# Patient Record
Sex: FEMALE | Race: WHITE | NOT HISPANIC OR LATINO | URBAN - METROPOLITAN AREA
[De-identification: names, ages, dates, MRNs, and addresses within clinical notes are randomized per-mention and may not be internally consistent; named-entity substitution may affect disease eponyms.]

---

## 2017-04-22 ENCOUNTER — COMMUNICATION - HEALTHEAST (OUTPATIENT)
Dept: FAMILY MEDICINE | Facility: CLINIC | Age: 62
End: 2017-04-22

## 2017-04-25 ENCOUNTER — COMMUNICATION - HEALTHEAST (OUTPATIENT)
Dept: FAMILY MEDICINE | Facility: CLINIC | Age: 62
End: 2017-04-25

## 2017-04-25 ENCOUNTER — AMBULATORY - HEALTHEAST (OUTPATIENT)
Dept: FAMILY MEDICINE | Facility: CLINIC | Age: 62
End: 2017-04-25

## 2017-04-25 DIAGNOSIS — L03.119 CELLULITIS AND ABSCESS OF LEG: ICD-10-CM

## 2017-04-25 DIAGNOSIS — L02.419 CELLULITIS AND ABSCESS OF LEG: ICD-10-CM

## 2017-04-26 ENCOUNTER — COMMUNICATION - HEALTHEAST (OUTPATIENT)
Dept: FAMILY MEDICINE | Facility: CLINIC | Age: 62
End: 2017-04-26

## 2017-04-26 ENCOUNTER — AMBULATORY - HEALTHEAST (OUTPATIENT)
Dept: FAMILY MEDICINE | Facility: CLINIC | Age: 62
End: 2017-04-26

## 2017-05-05 ENCOUNTER — COMMUNICATION - HEALTHEAST (OUTPATIENT)
Dept: FAMILY MEDICINE | Facility: CLINIC | Age: 62
End: 2017-05-05

## 2017-05-05 DIAGNOSIS — L02.419 CELLULITIS AND ABSCESS OF LEG: ICD-10-CM

## 2017-05-05 DIAGNOSIS — L03.119 CELLULITIS AND ABSCESS OF LEG: ICD-10-CM

## 2017-05-20 ENCOUNTER — COMMUNICATION - HEALTHEAST (OUTPATIENT)
Dept: FAMILY MEDICINE | Facility: CLINIC | Age: 62
End: 2017-05-20

## 2017-05-20 DIAGNOSIS — L03.119 CELLULITIS AND ABSCESS OF LEG: ICD-10-CM

## 2017-05-20 DIAGNOSIS — L02.419 CELLULITIS AND ABSCESS OF LEG: ICD-10-CM

## 2017-07-28 ENCOUNTER — COMMUNICATION - HEALTHEAST (OUTPATIENT)
Dept: FAMILY MEDICINE | Facility: CLINIC | Age: 62
End: 2017-07-28

## 2017-07-28 DIAGNOSIS — L30.8 PRURITIC DERMATITIS: ICD-10-CM

## 2017-07-28 DIAGNOSIS — R60.0 FLUID RETENTION IN LEGS: ICD-10-CM

## 2018-01-16 ENCOUNTER — COMMUNICATION - HEALTHEAST (OUTPATIENT)
Dept: FAMILY MEDICINE | Facility: CLINIC | Age: 63
End: 2018-01-16

## 2018-01-29 ENCOUNTER — OFFICE VISIT - HEALTHEAST (OUTPATIENT)
Dept: FAMILY MEDICINE | Facility: CLINIC | Age: 63
End: 2018-01-29

## 2018-01-29 DIAGNOSIS — E66.9 OBESITY: ICD-10-CM

## 2018-01-29 DIAGNOSIS — Z23 IMMUNIZATION DUE: ICD-10-CM

## 2018-01-29 DIAGNOSIS — L03.116 CELLULITIS OF LEFT LOWER EXTREMITY: ICD-10-CM

## 2018-01-29 DIAGNOSIS — I87.2 VENOUS STASIS DERMATITIS OF LEFT LOWER EXTREMITY: ICD-10-CM

## 2018-03-02 ENCOUNTER — COMMUNICATION - HEALTHEAST (OUTPATIENT)
Dept: FAMILY MEDICINE | Facility: CLINIC | Age: 63
End: 2018-03-02

## 2018-03-09 ENCOUNTER — OFFICE VISIT - HEALTHEAST (OUTPATIENT)
Dept: FAMILY MEDICINE | Facility: CLINIC | Age: 63
End: 2018-03-09

## 2018-03-09 ENCOUNTER — COMMUNICATION - HEALTHEAST (OUTPATIENT)
Dept: FAMILY MEDICINE | Facility: CLINIC | Age: 63
End: 2018-03-09

## 2018-03-09 ENCOUNTER — COMMUNICATION - HEALTHEAST (OUTPATIENT)
Dept: SCHEDULING | Facility: CLINIC | Age: 63
End: 2018-03-09

## 2018-03-09 DIAGNOSIS — L30.8 PRURITIC DERMATITIS: ICD-10-CM

## 2018-03-09 DIAGNOSIS — L03.116 CELLULITIS OF LEFT LOWER EXTREMITY: ICD-10-CM

## 2018-03-09 DIAGNOSIS — I87.2 VENOUS STASIS DERMATITIS OF LEFT LOWER EXTREMITY: ICD-10-CM

## 2018-03-09 DIAGNOSIS — R60.0 BILATERAL EDEMA OF LOWER EXTREMITY: ICD-10-CM

## 2018-03-09 DIAGNOSIS — R60.0 FLUID RETENTION IN LEGS: ICD-10-CM

## 2018-03-10 RX ORDER — DIPHENHYDRAMINE HYDROCHLORIDE 25 MG/1
CAPSULE ORAL
Qty: 60 CAPSULE | Refills: 0 | Status: SHIPPED | OUTPATIENT
Start: 2018-03-10

## 2018-03-10 RX ORDER — FUROSEMIDE 40 MG
TABLET ORAL
Qty: 90 TABLET | Refills: 0 | Status: SHIPPED | OUTPATIENT
Start: 2018-03-10

## 2021-06-01 VITALS — HEIGHT: 65 IN | BODY MASS INDEX: 49.92 KG/M2

## 2021-06-10 NOTE — PROGRESS NOTES
Patient sent me a message by my chart stating that she thinks she has an infection on her leg again.  She has had a sore on her right lower leg for about a week.  It is become increasingly red and painful.  It feels warm on the skin around the sore.  There are now some little blisters that are oozing.  She has tried several different kinds of antibiotic cream and nothing has helped.  The sore came from a very minor trauma which was scratching her leg against a bag full of garbage that she had ready to take outside.  She does not have any fever or chills.  She had an infection like this previously and I treated her with cephalexin.  That worked well and she did not have any problems with it.  I did send a prescription for cephalexin to her pharmacy.  She will take 1 twice a day for 10 days.  I explained that she should note significant improvement with in 48 hours.  If it worsens or does not improve, she should let me know.  She has not yet been to the vascular clinic.  She has put off going and now realizes that it is important to do so and she will make an appointment and follow through with that.  She will let me know if she has any further concerns.

## 2021-06-15 NOTE — PROGRESS NOTES
Chief Complaint   Patient presents with     Leg Pain     left leg discoloration (red)          HPI:   Jerry Otero is a 62 y.o. female with son has had sore on leg for the last two months. Painful.  Drains yellow fluid.  No fever. Has wrapped with ace wrap.  She had similar problem about a year ago with same leg.  Treated with antibiotics for a cellulitis and it eventually healed.    She takes lasix for her leg swelling, but only takes it 3-4 times weekly.  Has been treated for HTN in the past with lisinopril and metoprolol, but hasn't been on either one of them for a while.  She checks her BP at home and it runs 130's /80's  Denies chest pain or shortness of breath.    ROS:  A 10 point comprehensive review of systems was negative except as noted.     Medications:  Current Outpatient Prescriptions on File Prior to Visit   Medication Sig Dispense Refill     albuterol (PROAIR HFA;PROVENTIL HFA;VENTOLIN HFA) 90 mcg/actuation inhaler Inhale 2 puffs every 4 (four) hours as needed for wheezing. 1 Inhaler 0     aspirin 325 MG tablet Take 325 mg by mouth every 6 (six) hours as needed for pain (prn for pain).       furosemide (LASIX) 40 MG tablet TAKE 1 TABLET BY MOUTH EVERY MORNING 90 tablet 0     BANOPHEN 25 mg capsule TAKE 1-2 CAPSULES BY MOUTH EVERY 4 HOURS AS NEEDED FOR ITCHING. 60 capsule 1     lisinopril (PRINIVIL,ZESTRIL) 5 MG tablet Take 1 tablet (5 mg total) by mouth daily. 30 tablet 0     metoprolol succinate (TOPROL-XL) 25 MG Take 1 tablet (25 mg total) by mouth daily. 30 tablet 0     [DISCONTINUED] famotidine (PEPCID) 40 MG tablet Take 1 tablet (40 mg total) by mouth daily. 5 tablet 0     No current facility-administered medications on file prior to visit.          Social History:  Social History   Substance Use Topics     Smoking status: Never Smoker     Smokeless tobacco: Never Used     Alcohol use Not on file         Physical Exam:   Vitals:    01/29/18 1523   BP: (!) 162/98   Patient Site: Right Arm    Patient Position: Sitting   Cuff Size: Adult Large   Pulse: 76   Resp: 20   Temp: 98  F (36.7  C)   TempSrc: Oral       GEN:  NAD.  Morbidly obese  Left lower extremity:  Edema.  Venous stasis changes.  Mild erythema.  Mild warmth.  Few open areas.        Assessment/Plan:    1. Cellulitis of left lower extremity  cephalexin 500 mg tablet   2. Venous stasis dermatitis of left lower extremity     3. Obesity     4. Immunization due  Tdap vaccine,  8yo or older,  IM    Influenza, Seasonal,Quad Inj, 36+ MOS      Cellulitis of left leg, secondary to venous stasis.  Morbid obesity.    Will start keflex.  Elevated leg frequently.  Ace wrap for compression.  Recheck promptly for increasing redness, pain or fever.    Recheck in 10-14 days if not starting to improve.  Recheck in 3 weeks if improves with antibiotics.    Wrap as needed for dressing.    Try to take lasix daily.    BP elevated, but readings at home are normal.  Bring readings in with her to the next visit.      Gissell Pereyra MD      1/29/2018

## 2021-06-16 NOTE — PROGRESS NOTES
"  Chief Complaint   Patient presents with     Follow-up     Cellulitis          HPI:   Jerry Otero is a 62 y.o. female in for reevaluation of legs.  Was treated for cellulitis about 6 weeks ago. Improved, but there were still small open areas.  These have enlarged recently and are draining more. No fevers.  No pain but itchy.  Trying to elevate legs frequently during the day.  Wraps them sometimes with Ace wraps.  Has tried compression stockings but hasn't been able to get any large enough.  Has lasix and has been trying to take it daily.    ROS:  A 10 point comprehensive review of systems was negative except as noted.     Medications:  Current Outpatient Prescriptions on File Prior to Visit   Medication Sig Dispense Refill     albuterol (PROAIR HFA;PROVENTIL HFA;VENTOLIN HFA) 90 mcg/actuation inhaler Inhale 2 puffs every 4 (four) hours as needed for wheezing. 1 Inhaler 0     aspirin 325 MG tablet Take 325 mg by mouth every 6 (six) hours as needed for pain (prn for pain).       BANOPHEN 25 mg capsule TAKE 1-2 CAPSULES BY MOUTH EVERY 4 HOURS AS NEEDED FOR ITCHING. 60 capsule 1     furosemide (LASIX) 40 MG tablet TAKE 1 TABLET BY MOUTH EVERY MORNING 90 tablet 0     lisinopril (PRINIVIL,ZESTRIL) 5 MG tablet Take 1 tablet (5 mg total) by mouth daily. 30 tablet 0     metoprolol succinate (TOPROL-XL) 25 MG Take 1 tablet (25 mg total) by mouth daily. 30 tablet 0     No current facility-administered medications on file prior to visit.          Social History:  Social History   Substance Use Topics     Smoking status: Never Smoker     Smokeless tobacco: Never Used     Alcohol use Not on file         Physical Exam:   Vitals:    03/09/18 1448   BP: 138/84   Pulse: 96   Resp: 18   Temp: 98.9  F (37.2  C)   TempSrc: Oral   SpO2: 99%   Height: 5' 5\" (1.651 m)       GEN:  NAD  Legs:  Severe edema.  Venous stasis changes.  Weeping areas posterior lower left calf.  Mild erythema. Mild warmth        Assessment/Plan:    1. " Cellulitis of left lower extremity  cephalexin 500 mg tablet    mupirocin (BACTROBAN) 2 % ointment    DISCONTINUED: mupirocin (BACTROBAN) 2 % ointment   2. Venous stasis dermatitis of left lower extremity     3. Bilateral edema of lower extremity          Will use 7 more days of keflex--this is probably more of a problem of venous stasis dermatitis with severe edema.  Bactroban after that.  Ace wrap as able.  Elevate legs.  Recheck  Promptly if not improving.  Discussed referral to wound care/vascular clinic if not improving.            Gissell Pereyra MD      3/9/2018